# Patient Record
Sex: FEMALE | Race: ASIAN | Employment: UNEMPLOYED | URBAN - METROPOLITAN AREA
[De-identification: names, ages, dates, MRNs, and addresses within clinical notes are randomized per-mention and may not be internally consistent; named-entity substitution may affect disease eponyms.]

---

## 2022-08-07 ENCOUNTER — HOSPITAL ENCOUNTER (EMERGENCY)
Age: 47
Discharge: HOME | End: 2022-08-07
Attending: EMERGENCY MEDICINE
Payer: COMMERCIAL

## 2022-08-07 ENCOUNTER — APPOINTMENT (OUTPATIENT)
Dept: GENERAL RADIOLOGY | Age: 47
End: 2022-08-07
Attending: EMERGENCY MEDICINE
Payer: COMMERCIAL

## 2022-08-07 VITALS
WEIGHT: 115 LBS | SYSTOLIC BLOOD PRESSURE: 125 MMHG | DIASTOLIC BLOOD PRESSURE: 87 MMHG | HEART RATE: 77 BPM | RESPIRATION RATE: 16 BRPM | HEIGHT: 62 IN | BODY MASS INDEX: 21.16 KG/M2 | OXYGEN SATURATION: 96 % | TEMPERATURE: 98.6 F

## 2022-08-07 DIAGNOSIS — Z23 NEED FOR TDAP VACCINATION: ICD-10-CM

## 2022-08-07 DIAGNOSIS — S81.812A LACERATION OF LEFT LOWER EXTREMITY, INITIAL ENCOUNTER: Primary | ICD-10-CM

## 2022-08-07 DIAGNOSIS — S81.852A DOG BITE OF LEFT LOWER LEG, INITIAL ENCOUNTER: ICD-10-CM
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
## 2022-08-07 PROCEDURE — 74011250636 HC RX REV CODE- 250/636: Performed by: PHYSICIAN ASSISTANT

## 2022-08-07 PROCEDURE — 75810000293 HC SIMP/SUPERF WND  RPR

## 2022-08-07 PROCEDURE — 73590 X-RAY EXAM OF LOWER LEG: CPT

## 2022-08-07 PROCEDURE — 74011000250 HC RX REV CODE- 250: Performed by: PHYSICIAN ASSISTANT

## 2022-08-07 PROCEDURE — 99284 EMERGENCY DEPT VISIT MOD MDM: CPT

## 2022-08-07 PROCEDURE — 90715 TDAP VACCINE 7 YRS/> IM: CPT | Performed by: PHYSICIAN ASSISTANT

## 2022-08-07 PROCEDURE — 90471 IMMUNIZATION ADMIN: CPT

## 2022-08-07 RX ORDER — LIDOCAINE HYDROCHLORIDE AND EPINEPHRINE 10; 10 MG/ML; UG/ML
1.5 INJECTION, SOLUTION INFILTRATION; PERINEURAL ONCE
Status: COMPLETED | OUTPATIENT
Start: 2022-08-07 | End: 2022-08-07

## 2022-08-07 RX ORDER — DOXYCYCLINE 100 MG/1
100 CAPSULE ORAL 2 TIMES DAILY
Qty: 20 CAPSULE | Refills: 0 | Status: SHIPPED | OUTPATIENT
Start: 2022-08-07 | End: 2022-08-17

## 2022-08-07 RX ADMIN — LIDOCAINE HYDROCHLORIDE,EPINEPHRINE BITARTRATE 15 MG: 10; .01 INJECTION, SOLUTION INFILTRATION; PERINEURAL at 21:19

## 2022-08-07 RX ADMIN — TETANUS TOXOID, REDUCED DIPHTHERIA TOXOID AND ACELLULAR PERTUSSIS VACCINE, ADSORBED 0.5 ML: 5; 2.5; 8; 8; 2.5 SUSPENSION INTRAMUSCULAR at 22:18

## 2022-08-08 NOTE — ED TRIAGE NOTES
Pt arrives to ER w c/o dog bite to left lower leg. One puncture anterior, posterior laceration ~3-4cm. Pt reports dog UTD of shots. Bleeding controlled.

## 2022-08-08 NOTE — ED PROVIDER NOTES
EMERGENCY DEPARTMENT HISTORY AND PHYSICAL EXAM    Date: 8/7/2022  Patient Name: Samantha Kevin    History of Presenting Illness     Chief Complaint   Patient presents with    Dog Bite         History Provided By: Patient and Patient's     8:57 PM  Samantha Kevin is a 51 y.o. female who presents to the emergency department C/O dog bite to left lower leg. Patient states just prior to arrival she was outside in her garden when the neighbors dog ran up and bit her in the leg. They confirm that the dog's vaccines including rabies are up-to-date. Last tetanus unknown but patient believes it is definitely greater than 5 years. Pt denies extremity numbness or weakness, pulsatile bleeding, severe pain, and any other sxs or complaints. PCP: Odalys Brown MD        Past History     Past Medical History:  History reviewed. No pertinent past medical history. Past Surgical History:  History reviewed. No pertinent surgical history. Family History:  History reviewed. No pertinent family history. Social History:  Social History     Tobacco Use    Smoking status: Never    Smokeless tobacco: Never   Substance Use Topics    Alcohol use: Not Currently    Drug use: Never       Allergies: Allergies   Allergen Reactions    Penicillins Anaphylaxis         Review of Systems   Review of Systems   Constitutional: Negative. Musculoskeletal:  Positive for myalgias. Skin:  Positive for wound. Neurological:  Negative for weakness and numbness. All other systems reviewed and are negative. Physical Exam     Vitals:    08/07/22 2042   BP: 125/87   Pulse: 77   Resp: 16   Temp: 98.6 °F (37 °C)   SpO2: 96%   Weight: 52.2 kg (115 lb)   Height: 5' 2\" (1.575 m)     Physical Exam  Vitals and nursing note reviewed. Constitutional:       General: She is not in acute distress. Appearance: Normal appearance. She is normal weight. HENT:      Head: Normocephalic and atraumatic.    Musculoskeletal: Legs:    Skin:     General: Skin is warm and dry. Neurological:      General: No focal deficit present. Mental Status: She is alert and oriented to person, place, and time. Psychiatric:         Mood and Affect: Mood normal.         Behavior: Behavior normal.             Diagnostic Study Results     Labs -   No results found for this or any previous visit (from the past 12 hour(s)). Radiologic Studies -   XR TIB/FIB LT   Final Result      1. No evidence of fracture or radiopaque foreign body. CT Results  (Last 48 hours)      None          CXR Results  (Last 48 hours)      None            Medications given in the ED-  Medications   lidocaine-EPINEPHrine (XYLOCAINE) 1 %-1:100,000 injection 15 mg (15 mg IntraDERMal Given by Provider 8/7/22 2119)   diph,Pertuss(AC),Tet Vac-PF (BOOSTRIX) suspension 0.5 mL (0.5 mL IntraMUSCular Given 8/7/22 2218)         Medical Decision Making   I am the first provider for this patient. I reviewed the vital signs, available nursing notes, past medical history, past surgical history, family history and social history. Vital Signs-Reviewed the patient's vital signs.     Records Reviewed: Nursing Notes      Procedures:  Wound Repair    Date/Time: 8/7/2022 10:20 PM  Performed by: PAPreparation: skin prepped with Shur-Clens, sterile field established and skin prepped with Betadine  Location details: left leg  Wound length:2.6 - 7.5 cm  Anesthesia: local infiltration    Anesthesia:  Local Anesthetic: lidocaine 1% with epinephrine  Anesthetic total: 3 mL  Foreign bodies: no foreign bodies  Irrigation solution: saline  Irrigation method: syringe  Debridement: none  Skin closure: Prolene (4-0)  Number of sutures: 6 (1 suture passed through 6 times)  Technique: running  Approximation: close  Dressing: gauze roll  Patient tolerance: patient tolerated the procedure well with no immediate complications  My total time at bedside, performing this procedure was 16-30 minutes. ED Course:  8:57 PM   Initial assessment performed. The patients presenting problems have been discussed, and they are in agreement with the care plan formulated and outlined with them. I have encouraged them to ask questions as they arise throughout their visit. Provider Notes (Medical Decision Making): Sylwia Poon is a 51 y.o. female who presents with dog bite with small puncture and laceration that required sutures to left lower leg. Tdap updated. She is neurovascular intact without muscle tendon involvement or foreign body. Wound was cleansed and irrigated thoroughly with saline and Shur-Clens. X-ray of the tib-fib shows no fractures or foreign bodies. Patient ports anaphylaxis to penicillin so will prescribe Doxy for wound infection prevention. Proper wound care need for suture removal in 10 to 14 days discussed. Diagnosis and Disposition       DISCHARGE NOTE:    Nichelle Perkins's  results have been reviewed with her. She has been counseled regarding her diagnosis, treatment, and plan. She verbally conveys understanding and agreement of the signs, symptoms, diagnosis, treatment and prognosis and additionally agrees to follow up as discussed. She also agrees with the care-plan and conveys that all of her questions have been answered. I have also provided discharge instructions for her that include: educational information regarding their diagnosis and treatment, and list of reasons why they would want to return to the ED prior to their follow-up appointment, should her condition change. She has been provided with education for proper emergency department utilization. CLINICAL IMPRESSION:    1. Laceration of left lower extremity, initial encounter    2. Dog bite of left lower leg, initial encounter    3. Need for Tdap vaccination        PLAN:  1. D/C Home  2.    Current Discharge Medication List        START taking these medications    Details   doxycycline (MONODOX) 100 mg capsule Take 1 Capsule by mouth two (2) times a day for 10 days. Qty: 20 Capsule, Refills: 0  Start date: 8/7/2022, End date: 8/17/2022           3. Follow-up Information       Follow up With Specialties Details Why Contact Info    Maryjane Aparicio MD Family Medicine  or urgent care for suture removal in 10-14 days [address] [id number] [phone]      [facility] EMERGENCY DEPT Emergency Medicine  As needed, If symptoms worsen [address] [id number]  [phone]          _______________________________      Please note that this dictation was completed with [employer], the computer voice recognition software. Quite often unanticipated grammatical, syntax, homophones, and other interpretive errors are inadvertently transcribed by the computer software. Please disregard these errors. Please excuse any errors that have escaped final proofreading.

## 2022-08-13 ENCOUNTER — HOSPITAL ENCOUNTER (EMERGENCY)
Age: 47
Discharge: HOME | End: 2022-08-13
Attending: EMERGENCY MEDICINE
Payer: COMMERCIAL

## 2022-08-13 VITALS
TEMPERATURE: 98.8 F | WEIGHT: 115 LBS | OXYGEN SATURATION: 100 % | SYSTOLIC BLOOD PRESSURE: 110 MMHG | RESPIRATION RATE: 18 BRPM | DIASTOLIC BLOOD PRESSURE: 69 MMHG | HEART RATE: 72 BPM | BODY MASS INDEX: 21.03 KG/M2

## 2022-08-13 DIAGNOSIS — Z51.89 VISIT FOR WOUND CHECK: ICD-10-CM

## 2022-08-13 DIAGNOSIS — L08.9 INFECTED DOG BITE OF LOWER LEG, LEFT, INITIAL ENCOUNTER: Primary | ICD-10-CM

## 2022-08-13 DIAGNOSIS — S81.852A INFECTED DOG BITE OF LOWER LEG, LEFT, INITIAL ENCOUNTER: Primary | ICD-10-CM
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
## 2022-08-13 PROCEDURE — 74011250637 HC RX REV CODE- 250/637: Performed by: PHYSICIAN ASSISTANT

## 2022-08-13 PROCEDURE — 99283 EMERGENCY DEPT VISIT LOW MDM: CPT

## 2022-08-13 RX ORDER — ONDANSETRON 4 MG/1
4 TABLET, FILM COATED ORAL
Qty: 20 TABLET | Refills: 0 | Status: SHIPPED | OUTPATIENT
Start: 2022-08-13

## 2022-08-13 RX ORDER — CLINDAMYCIN HYDROCHLORIDE 150 MG/1
300 CAPSULE ORAL EVERY 6 HOURS
Qty: 80 CAPSULE | Refills: 0 | Status: SHIPPED | OUTPATIENT
Start: 2022-08-13 | End: 2022-08-23

## 2022-08-13 RX ORDER — CLINDAMYCIN HYDROCHLORIDE 150 MG/1
300 CAPSULE ORAL
Status: COMPLETED | OUTPATIENT
Start: 2022-08-13 | End: 2022-08-13

## 2022-08-13 RX ORDER — ONDANSETRON 4 MG/1
4 TABLET, ORALLY DISINTEGRATING ORAL
Status: COMPLETED | OUTPATIENT
Start: 2022-08-13 | End: 2022-08-13

## 2022-08-13 RX ADMIN — ONDANSETRON 4 MG: 4 TABLET, ORALLY DISINTEGRATING ORAL at 11:19

## 2022-08-13 RX ADMIN — CLINDAMYCIN HYDROCHLORIDE 300 MG: 150 CAPSULE ORAL at 11:19

## 2022-08-13 NOTE — ED PROVIDER NOTES
EMERGENCY DEPARTMENT HISTORY AND PHYSICAL EXAM    Date: 8/13/2022  Patient Name: Samantha Kevin    History of Presenting Illness     Chief Complaint   Patient presents with    Wound Check         History Provided By: Patient    Chief Complaint: wound check    HPI(Context):   10:26 AM  Samantha Kevin is a 51 y.o. female with no PMH who presents to the emergency department for wound check. Associated sxs include redness, pain, and swelling. Pt was bitten by her dog on LLE on  08/07/2022. Pt was seen at this facility same day. Rabies UTD for animal. Pt had suture repair performed. Placed on doxy as pt has PCN allergy. Pt notes wound became red 2-3 days ago. She notes some drainage from wound. Pt is not DM and is immunocompetent. Pt denies fever, chills, nausea, vomiting, numbness, weakness, and any other sxs or complaints. PCP: Odalys Brown MD    Current Outpatient Medications   Medication Sig Dispense Refill    clindamycin (CLEOCIN) 150 mg capsule Take 2 Capsules by mouth every six (6) hours for 10 days. Indications: skin infection due to anaerobic bacteria 80 Capsule 0    ondansetron hcl (Zofran) 4 mg tablet Take 1 Tablet by mouth every eight (8) hours as needed for Nausea or Vomiting. 20 Tablet 0    doxycycline (MONODOX) 100 mg capsule Take 1 Capsule by mouth two (2) times a day for 10 days. 20 Capsule 0       Past History     Past Medical History:  No pertinent PMH    Past Surgical History:  No pertinent PSHx    Family History:  No pertinent PFmHx    Social History:  Social History     Tobacco Use    Smoking status: Never    Smokeless tobacco: Never   Substance Use Topics    Alcohol use: Not Currently    Drug use: Never       Allergies: Allergies   Allergen Reactions    Penicillins Anaphylaxis         Review of Systems   Review of Systems   Constitutional:  Negative for chills and fever. Gastrointestinal:  Negative for nausea and vomiting. Skin:  Positive for color change and wound. Allergic/Immunologic: Negative for immunocompromised state. Neurological:  Negative for weakness and numbness. All other systems reviewed and are negative. Physical Exam     Vitals:    08/13/22 1018 08/13/22 1019   BP:  110/69   Pulse: 72    Resp: 18    Temp: 98.8 °F (37.1 °C)    SpO2:  100%   Weight:  52.2 kg (115 lb)     Physical Exam  Vitals and nursing note reviewed. Constitutional:       General: She is not in acute distress. Appearance: She is well-developed. She is not diaphoretic. Comments:  female in NAD. Alert. SO at bedside. HENT:      Head: Normocephalic and atraumatic. Right Ear: External ear normal.      Left Ear: External ear normal.      Nose: Nose normal.   Eyes:      General: No scleral icterus. Right eye: No discharge. Left eye: No discharge. Conjunctiva/sclera: Conjunctivae normal.   Cardiovascular:      Rate and Rhythm: Normal rate and regular rhythm. Pulses:           Dorsalis pedis pulses are 2+ on the right side and 2+ on the left side. Posterior tibial pulses are 2+ on the right side and 2+ on the left side. Heart sounds: Normal heart sounds. No murmur heard. No friction rub. No gallop. Pulmonary:      Effort: Pulmonary effort is normal. No tachypnea, accessory muscle usage or respiratory distress. Breath sounds: Normal breath sounds. No decreased breath sounds, wheezing, rhonchi or rales. Musculoskeletal:         General: Normal range of motion. Cervical back: Normal range of motion. Right ankle: Normal.      Left ankle: Normal.      Right foot: Normal.      Left foot: Normal.   Skin:     General: Skin is warm and dry. Comments: Sutured wound to medial aspect of LLE with surrounding erythema, moderate tenderness, and mild swelling. No purulence expressed. No induration. No erythematous streaking. Neurological:      Mental Status: She is alert and oriented to person, place, and time. Psychiatric:         Judgment: Judgment normal.           Diagnostic Study Results     Labs -   No results found for this or any previous visit (from the past 12 hour(s)). No orders to display     CT Results  (Last 48 hours)      None          CXR Results  (Last 48 hours)      None            Medications given in the ED-  Medications   clindamycin (CLEOCIN) capsule 300 mg (300 mg Oral Given 8/13/22 1119)   ondansetron (ZOFRAN ODT) tablet 4 mg (4 mg Oral Given 8/13/22 1119)         Medical Decision Making   I am the first provider for this patient. I reviewed the vital signs, available nursing notes, past medical history, past surgical history, family history and social history. Vital Signs-Reviewed the patient's vital signs. Pulse Oximetry Analysis - 100% on RA. NORMAL     Records Reviewed: Nursing Notes, Old Medical Records, and Previous Radiology Studies    Provider Notes (Medical Decision Making): dog bite wound check    Procedures:  Procedures    ED Course:   10:26 AM Initial assessment performed. The patients presenting problems have been discussed, and they are in agreement with the care plan formulated and outlined with them. I have encouraged them to ask questions as they arise throughout their visit. Diagnosis and Disposition       Pt on doxy for dog bite. Needs anaerobic coverage. Will add clinda. No wound drainage. Doubt need to have sutures removed today but discussed proper wound care and instructed RTED in 48-72 hours for wound check. Reasons to RTED sooner discussed including increased pain, drainage, erythematous streaking, fever, nausea, or vomiting. All questions answered. Pt feels comfortable going home at this time. Pt expressed understanding and she agrees with plan. 1. Infected dog bite of lower leg, left, initial encounter    2. Visit for wound check        PLAN:  1. D/C Home  2.    Discharge Medication List as of 8/13/2022 10:53 AM        START taking these medications    Details   clindamycin (CLEOCIN) 150 mg capsule Take 2 Capsules by mouth every six (6) hours for 10 days. Indications: skin infection due to anaerobic bacteria, Normal, Disp-80 Capsule, R-0           CONTINUE these medications which have NOT CHANGED    Details   doxycycline (MONODOX) 100 mg capsule Take 1 Capsule by mouth two (2) times a day for 10 days. , Normal, Disp-20 Capsule, R-0           3. Follow-up Information       Follow up With Specialties Details Why Contact Info    Odalys Brown MD Family Medicine   [address] [place] [id number] [phone]      [facility] EMERGENCY DEPT Emergency Medicine  return for recheck in 2-3 days. Return sooner if symptoms worsen [address] [id number]  [phone]          _______________________________    Attestations: This note is prepared by Ana Gomez PA-C.  _______________________________        Please note that this dictation was completed with [employer], the computer voice recognition software. Quite often unanticipated grammatical, syntax, homophones, and other interpretive errors are inadvertently transcribed by the computer software. Please disregard these errors. Please excuse any errors that have escaped final proofreading.   flip

## 2023-12-01 ENCOUNTER — ANESTHESIA (OUTPATIENT)
Facility: HOSPITAL | Age: 48
End: 2023-12-01
Payer: COMMERCIAL

## 2023-12-01 ENCOUNTER — ANESTHESIA EVENT (OUTPATIENT)
Facility: HOSPITAL | Age: 48
End: 2023-12-01
Payer: COMMERCIAL

## 2023-12-01 ENCOUNTER — HOSPITAL ENCOUNTER (OUTPATIENT)
Facility: HOSPITAL | Age: 48
Setting detail: OUTPATIENT SURGERY
Discharge: HOME | End: 2023-12-01
Attending: INTERNAL MEDICINE | Admitting: INTERNAL MEDICINE
Payer: COMMERCIAL

## 2023-12-01 VITALS
DIASTOLIC BLOOD PRESSURE: 70 MMHG | BODY MASS INDEX: 20.73 KG/M2 | OXYGEN SATURATION: 100 % | WEIGHT: 117 LBS | HEART RATE: 58 BPM | SYSTOLIC BLOOD PRESSURE: 107 MMHG | TEMPERATURE: 97.8 F | HEIGHT: 63 IN | RESPIRATION RATE: 18 BRPM

## 2023-12-01 LAB — HCG UR QL: NEGATIVE

## 2023-12-01 PROCEDURE — 2500000003 HC RX 250 WO HCPCS: Performed by: NURSE ANESTHETIST, CERTIFIED REGISTERED

## 2023-12-01 PROCEDURE — 6360000002 HC RX W HCPCS: Performed by: NURSE ANESTHETIST, CERTIFIED REGISTERED

## 2023-12-01 PROCEDURE — 3700000001 HC ADD 15 MINUTES (ANESTHESIA): Performed by: INTERNAL MEDICINE

## 2023-12-01 PROCEDURE — 88305 TISSUE EXAM BY PATHOLOGIST: CPT

## 2023-12-01 PROCEDURE — 3600007502: Performed by: INTERNAL MEDICINE

## 2023-12-01 PROCEDURE — 3600007512: Performed by: INTERNAL MEDICINE

## 2023-12-01 PROCEDURE — 2709999900 HC NON-CHARGEABLE SUPPLY: Performed by: INTERNAL MEDICINE

## 2023-12-01 PROCEDURE — 2580000003 HC RX 258: Performed by: INTERNAL MEDICINE

## 2023-12-01 PROCEDURE — 3700000000 HC ANESTHESIA ATTENDED CARE: Performed by: INTERNAL MEDICINE

## 2023-12-01 PROCEDURE — 7100000010 HC PHASE II RECOVERY - FIRST 15 MIN: Performed by: INTERNAL MEDICINE

## 2023-12-01 PROCEDURE — 7100000011 HC PHASE II RECOVERY - ADDTL 15 MIN: Performed by: INTERNAL MEDICINE

## 2023-12-01 PROCEDURE — 81025 URINE PREGNANCY TEST: CPT

## 2023-12-01 RX ORDER — GLYCOPYRROLATE 0.2 MG/ML
0.1 INJECTION INTRAMUSCULAR; INTRAVENOUS ONCE
Status: DISCONTINUED | OUTPATIENT
Start: 2023-12-01 | End: 2023-12-01 | Stop reason: HOSPADM

## 2023-12-01 RX ORDER — INDOMETHACIN 50 MG/1
100 SUPPOSITORY RECTAL EVERY 12 HOURS PRN
Status: DISCONTINUED | OUTPATIENT
Start: 2023-12-01 | End: 2023-12-01 | Stop reason: HOSPADM

## 2023-12-01 RX ORDER — SODIUM CHLORIDE 0.9 % (FLUSH) 0.9 %
5-40 SYRINGE (ML) INJECTION PRN
Status: CANCELLED | OUTPATIENT
Start: 2023-12-01

## 2023-12-01 RX ORDER — MIDAZOLAM HYDROCHLORIDE 1 MG/ML
5 INJECTION, SOLUTION INTRAMUSCULAR; INTRAVENOUS
Status: DISCONTINUED | OUTPATIENT
Start: 2023-12-01 | End: 2023-12-01 | Stop reason: HOSPADM

## 2023-12-01 RX ORDER — SIMETHICONE 20 MG/.3ML
40 EMULSION ORAL EVERY 6 HOURS PRN
Status: DISCONTINUED | OUTPATIENT
Start: 2023-12-01 | End: 2023-12-01 | Stop reason: HOSPADM

## 2023-12-01 RX ORDER — DIPHENHYDRAMINE HYDROCHLORIDE 50 MG/ML
25 INJECTION INTRAMUSCULAR; INTRAVENOUS EVERY 6 HOURS PRN
Status: DISCONTINUED | OUTPATIENT
Start: 2023-12-01 | End: 2023-12-01 | Stop reason: HOSPADM

## 2023-12-01 RX ORDER — NALOXONE HYDROCHLORIDE 0.4 MG/ML
0.4 INJECTION, SOLUTION INTRAMUSCULAR; INTRAVENOUS; SUBCUTANEOUS PRN
Status: DISCONTINUED | OUTPATIENT
Start: 2023-12-01 | End: 2023-12-01 | Stop reason: HOSPADM

## 2023-12-01 RX ORDER — FENTANYL CITRATE 50 UG/ML
100 INJECTION, SOLUTION INTRAMUSCULAR; INTRAVENOUS
Status: DISCONTINUED | OUTPATIENT
Start: 2023-12-01 | End: 2023-12-01 | Stop reason: HOSPADM

## 2023-12-01 RX ORDER — SODIUM CHLORIDE 9 MG/ML
INJECTION, SOLUTION INTRAVENOUS CONTINUOUS
Status: DISCONTINUED | OUTPATIENT
Start: 2023-12-01 | End: 2023-12-01

## 2023-12-01 RX ORDER — SODIUM CHLORIDE 0.9 % (FLUSH) 0.9 %
5-40 SYRINGE (ML) INJECTION EVERY 12 HOURS SCHEDULED
Status: CANCELLED | OUTPATIENT
Start: 2023-12-01

## 2023-12-01 RX ORDER — PROPOFOL 10 MG/ML
INJECTION, EMULSION INTRAVENOUS PRN
Status: DISCONTINUED | OUTPATIENT
Start: 2023-12-01 | End: 2023-12-01 | Stop reason: SDUPTHER

## 2023-12-01 RX ORDER — EPINEPHRINE IN SOD CHLOR,ISO 1 MG/10 ML
1 SYRINGE (ML) INTRAVENOUS ONCE
Status: DISCONTINUED | OUTPATIENT
Start: 2023-12-01 | End: 2023-12-01 | Stop reason: HOSPADM

## 2023-12-01 RX ORDER — LIDOCAINE HCL/PF 100 MG/5ML
SYRINGE (ML) INJECTION PRN
Status: DISCONTINUED | OUTPATIENT
Start: 2023-12-01 | End: 2023-12-01 | Stop reason: SDUPTHER

## 2023-12-01 RX ORDER — SODIUM CHLORIDE 9 MG/ML
INJECTION, SOLUTION INTRAVENOUS CONTINUOUS
Status: CANCELLED | OUTPATIENT
Start: 2023-12-01

## 2023-12-01 RX ORDER — FLUMAZENIL 0.1 MG/ML
0.2 INJECTION INTRAVENOUS ONCE
Status: DISCONTINUED | OUTPATIENT
Start: 2023-12-01 | End: 2023-12-01 | Stop reason: HOSPADM

## 2023-12-01 RX ORDER — SODIUM CHLORIDE 9 MG/ML
INJECTION, SOLUTION INTRAVENOUS PRN
Status: CANCELLED | OUTPATIENT
Start: 2023-12-01

## 2023-12-01 RX ORDER — ATROPINE SULFATE 0.4 MG/ML
0.4 INJECTION, SOLUTION INTRAVENOUS ONCE
Status: DISCONTINUED | OUTPATIENT
Start: 2023-12-01 | End: 2023-12-01 | Stop reason: HOSPADM

## 2023-12-01 RX ADMIN — PROPOFOL 100 MG: 10 INJECTION, EMULSION INTRAVENOUS at 15:40

## 2023-12-01 RX ADMIN — PROPOFOL 50 MG: 10 INJECTION, EMULSION INTRAVENOUS at 15:44

## 2023-12-01 RX ADMIN — Medication 50 MG: at 15:40

## 2023-12-01 RX ADMIN — SODIUM CHLORIDE: 9 INJECTION, SOLUTION INTRAVENOUS at 14:45

## 2023-12-01 ASSESSMENT — PAIN SCALES - GENERAL: PAINLEVEL_OUTOF10: 0

## 2023-12-01 NOTE — H&P
Assessment/Plan  # Detail Type Description    1. Assessment Colon cancer screening (Z12.11). Patient Plan pt is a 50 y/o female here for her initial CRC screening. Paternal uncle had CRC at age 72. no h/o Ramos syndrome. +appendectomy   Labs from 5/23/23 review WNL no anemia nor liver chemistry's     The risks, benefits, adverse reactions were discussed in detail today with the patient. This includes, but is not limited to, the risk of bleeding, infections, perforation, death, missed lesions, reactions to anesthesia/sedation, other. They understand and agree to undergo the procedure. Plan  Colonoscopy    Plan Orders DIAGNOSTIC COLONOSCOPY to be performed. 2. Assessment Body mass index (BMI) 20.0-20.9, adult (Z68.20). Plan Orders Today's instructions / counseling include(s) Dietary management education, guidance, and counseling and Weight monitoring . This 49year old  patient was referred by Digna Suh. This 49year old patient presents for Colon Cancer Screening. History of Present Illness  1. Colon Cancer Screening   No prior screening. Denies risk factors. Pertinent negatives include abdominal pain, change in bowel habits, change in stool caliber, constipation, decreased appetite, diarrhea, melena, nausea, rectal bleeding, vomiting, weight gain and weight loss. Additional information: No family history of colon cancer, No family history of Crohn's/colitis and No NSAID/ASA use. Problem List  Problem List reviewed. Medications (active prior to today)  Medication Name Sig Description Start Date Stop Date Refilled Rx Elsewhere   melatonin  //   Y   multivitamin tablet  //   Y     Patient Status   Completed with information received for patient transitioning into care. Completed with information received for patient in a summary of care record. Medication Reconciliation  Medications reconciled today.     Medication Reviewed  Adherence Medication Name Sig Desc Elsewhere Status   taking as directed multivitamin tablet  Y Verified   taking as directed melatonin  Y Verified     Medications (Added, Continued or Stopped today)  Start Date Medication Directions PRN Status PRN Reason Instruction Stop Date    melatonin  N       multivitamin tablet  N        Allergies  Ingredient Reaction (Severity) Comment   GARLIC hives    PENICILLINS Hives    SHELLFISH DERIVED ? rash            Review of Systems  System Neg/Pos Details   Constitutional Negative Weight gain and Weight loss. Constitutional Comments Pt denies recent h/o falls,  fever chills, SOB, palpitations, weight gain or weight loss. No recent onset of swelling in hands/feet or falls. No h/o reaction to anesthesia. GI Negative Abdominal pain, Change in bowel habits, Change in stool caliber, Constipation, Decreased appetite, Diarrhea, Melena, Nausea, Rectal bleeding and Vomiting. Vital Signs   Gynecologic History  Patient is perimenopausal.      Height  Time ft in cm Last Measured Height Position    9:45 AM 5.0 3.00 160.02 05/30/2023 0     Weight/BSA/BMI  Time lb oz kg Context BMI kg/m2 BSA m2    9:45 .00  53.070  20.73      Blood Pressure  Time BP mm/Hg Position Side Site Method Cuff Size    9:45 /72          Temperature/Pulse/Respiration  Time Temp F Temp C Temp Site Pulse/min Pattern Resp/ min    9:45 AM    80 regular      Pulse Oximetry/FIO2  Time Pulse Ox (Rest %) Pulse Ox (Amb %) O2 Sat O2 L/Min Timing FiO2 % L/min Delivery Method Finger Probe    9:45 AM 96             Measured by  Time Measured by    9:45 AM Gopal Stearns     Physical  Exam  Exam Findings Details   Constitutional Normal Well developed. Eyes Normal Sclera - Right: Normal, Left: Normal.   Nasopharynx Normal Lips/teeth/gums - Normal.   Neck Exam Normal Inspection - Normal. Thyroid gland - Normal.   Respiratory * Inspection - Side: bilateral, Location: No increased WOB.    Respiratory Normal Auscultation - Normal.

## 2023-12-01 NOTE — PROCEDURES
Colonoscopy Procedure Note    Indications: Routine screening    Anesthesia/Sedation: MAC anesthesia Propofol    Pre-Procedure Exam:  Airway: clear   Heart: normal S1and S2    Lungs: clear bilateral  Abdomen: soft, nontender, bowel sounds present and normal in all quadrants   Mental Status: awake, alert, and oriented to person, place, and time      Procedure in Detail:  Informed consent was obtained for the procedure, including sedation. Risks of perforation, hemorrhage, adverse drug reaction, and aspiration were discussed. The patient was placed in the left lateral decubitus position. Based on the pre-procedure assessment, including review of the patient's medical history, medications, allergies, and review of systems, she had been deemed to be an appropriate candidate for moderate sedation; she was therefore sedated with the medications listed above. The patient was monitored continuously with ECG tracing, pulse oximetry, blood pressure monitoring, and direct observations. A rectal examination was performed. The [id number] was inserted into the rectum and advanced under direct vision to the cecum, which was identified by the ileocecal valve and appendiceal orifice. The quality of the colonic preparation was excellent. A careful inspection was made as the colonoscope was withdrawn, including a retroflexed view of the rectum; findings and interventions are described below. Appropriate photodocumentation was obtained. ANUS: Anal exam reveals no masses or hemorrhoids, sphincter tone is normal.   RECTUM: Rectal exam reveals no masses or hemorrhoids. Two polyps (4-5mm) removed cold snare  SIGMOID COLON: The mucosa is normal with good vascular pattern and without ulcers, diverticula, and polyps. DESCENDING COLON: The mucosa is normal with good vascular pattern and without ulcers, diverticula, and polyps.    SPLENIC FLEXURE: The splenic flexure is normal.   TRANSVERSE COLON: The mucosa is normal with good

## 2023-12-01 NOTE — ANESTHESIA POSTPROCEDURE EVALUATION
Department of Anesthesiology  Postprocedure Note    Patient: Camacho Guo  MRN: [id number]  YOB: 1975  Date of evaluation: 12/1/2023      Procedure Summary     Date: 12/01/23 Room / Location: [facility] ENDO 02 / [facility] ENDOSCOPY    Anesthesia Start: 6128 Anesthesia Stop: 2858    Procedure: COLONOSCOPY; POLYPECTOMY (Lower GI Region) Diagnosis:       Special screening for malignant neoplasms, colon      (Special screening for malignant neoplasms, colon [Z12.11])    Surgeons: Tilden Halsted, MD Responsible Provider: Judith Shirley MD    Anesthesia Type: MAC ASA Status: 1          Anesthesia Type: MAC    Abad Phase I: Abad Score: 10    Abad Phase II:        Anesthesia Post Evaluation    Patient location during evaluation: PACU  Patient participation: complete - patient participated  Level of consciousness: awake  Pain score: 0  Airway patency: patent  Nausea & Vomiting: no nausea and no vomiting  Complications: no  Cardiovascular status: blood pressure returned to baseline  Respiratory status: acceptable  Hydration status: stable  Multimodal analgesia pain management approach  Pain management: satisfactory to patient

## 2023-12-01 NOTE — DISCHARGE INSTRUCTIONS
Celina Emmanuel  [id number]  1975    COLON DISCHARGE INSTRUCTIONS    Discomfort:  Redness at IV site- apply warm compress to area; if redness or soreness persist- contact your physician  There may be a slight amount of blood passed from the rectum  Gaseous discomfort- walking, belching will help relieve any discomfort  You should not operate a vehicle for 12 hours  You should not engage in an occupation involving machinery or appliances for rest of today  You may not drink alcoholic beverages for at least 12 hours  Avoid making any critical decisions for at least 24 hour  DIET:   Regular Diet   - however -  remember your colon is empty and a heavy meal will produce gas. Avoid these foods:  vegetables, fried / greasy foods, carbonated drinks for today     ACTIVITY:  You may resume your normal daily activities it is recommended that you spend the remainder of the day resting -  avoid any strenuous activity. CALL M.D. ANY SIGN OF:   Increasing pain, nausea, vomiting  Abdominal distension (swelling)  New increased bleeding (oral or rectal)  Fever (chills)  Pain in chest area  Bloody discharge from nose or mouth  Shortness of breath      Follow-up Instructions:  Pending pathology                          Celina Emmanuel  [id number]  1975        DISCHARGE SUMMARY from Nurse    PATIENT INSTRUCTIONS:    After general anesthesia or intravenous sedation, for 24 hours or while taking prescription Narcotics:  Limit your activities  Do not drive and operate hazardous machinery  Do not make important personal or business decisions  Do  not drink alcoholic beverages  If you have not urinated within 8 hours after discharge, please contact your surgeon on call.     Report the following to your surgeon:  Excessive pain, swelling, redness or odor of or around the surgical area  Temperature over 100.5  Nausea and vomiting lasting longer than 4 hours or if unable to take medications  Any signs of decreased circulation or nerve

## (undated) DEVICE — BLUNTFILL: Brand: MONOJECT

## (undated) DEVICE — SYRINGE MED 5ML STD CLR PLAS LUERLOCK TIP N CTRL DISP

## (undated) DEVICE — SINGLE PORT MANIFOLD: Brand: NEPTUNE 2

## (undated) DEVICE — CANNULA CUSH AD W/ 14FT TBG

## (undated) DEVICE — SOLUTION IV 1000ML 20MEQ K CHL IN 0.9% SOD CHL FLX CONT

## (undated) DEVICE — TOURNIQUET PHLEB W1XL18IN BLU FLAT RL AND BND REUSE FOR IV

## (undated) DEVICE — Device

## (undated) DEVICE — KENDALL RADIOLUCENT FOAM MONITORING ELECTRODE RECTANGULAR SHAPE: Brand: KENDALL

## (undated) DEVICE — SYRINGE MEDICAL 3ML CLEAR PLASTIC STANDARD NON CONTROL LUERLOCK TIP DISPOSABLE

## (undated) DEVICE — CATHETER PH SUCT 14FR

## (undated) DEVICE — WRISTBAND ID AD W2.5XL9.5CM RED VYN ADH CLSR UNI-PRINT

## (undated) DEVICE — CATHETER IV 22GA L1IN BLU POLYUR STR HUB RADPQ PROTCT +

## (undated) DEVICE — TUBING, SUCTION, 1/4" X 12', STRAIGHT: Brand: MEDLINE

## (undated) DEVICE — SNARE POLYP SM W13MMXL240CM SHTH DIA2.4MM OVL FLX DISP

## (undated) DEVICE — SYRINGE 50ML E/T